# Patient Record
Sex: FEMALE | Race: BLACK OR AFRICAN AMERICAN | NOT HISPANIC OR LATINO | URBAN - METROPOLITAN AREA
[De-identification: names, ages, dates, MRNs, and addresses within clinical notes are randomized per-mention and may not be internally consistent; named-entity substitution may affect disease eponyms.]

---

## 2023-12-01 ENCOUNTER — ANCILLARY PROCEDURE (OUTPATIENT)
Dept: EMERGENCY MEDICINE | Facility: HOSPITAL | Age: 43
End: 2023-12-01
Payer: MEDICAID

## 2023-12-01 ENCOUNTER — APPOINTMENT (OUTPATIENT)
Dept: RADIOLOGY | Facility: HOSPITAL | Age: 43
End: 2023-12-01
Payer: MEDICAID

## 2023-12-01 ENCOUNTER — HOSPITAL ENCOUNTER (EMERGENCY)
Facility: HOSPITAL | Age: 43
Discharge: HOME | End: 2023-12-02
Attending: EMERGENCY MEDICINE
Payer: MEDICAID

## 2023-12-01 DIAGNOSIS — G43.009 MIGRAINE WITHOUT AURA AND WITHOUT STATUS MIGRAINOSUS, NOT INTRACTABLE: Primary | ICD-10-CM

## 2023-12-01 LAB
ALBUMIN SERPL BCP-MCNC: 4.7 G/DL (ref 3.4–5)
ALP SERPL-CCNC: 80 U/L (ref 33–110)
ALT SERPL W P-5'-P-CCNC: 11 U/L (ref 7–45)
ANION GAP BLDV CALCULATED.4IONS-SCNC: 15 MMOL/L (ref 10–25)
ANION GAP BLDV CALCULATED.4IONS-SCNC: ABNORMAL MMOL/L
ANION GAP SERPL CALC-SCNC: 19 MMOL/L (ref 10–20)
AST SERPL W P-5'-P-CCNC: 15 U/L (ref 9–39)
ATRIAL RATE: 74 BPM
B-HCG SERPL-ACNC: <3 MIU/ML
BASE EXCESS BLDV CALC-SCNC: -0.6 MMOL/L (ref -2–3)
BASE EXCESS BLDV CALC-SCNC: -0.9 MMOL/L (ref -2–3)
BASOPHILS # BLD AUTO: 0.03 X10*3/UL (ref 0–0.1)
BASOPHILS NFR BLD AUTO: 0.2 %
BILIRUB SERPL-MCNC: 0.7 MG/DL (ref 0–1.2)
BODY TEMPERATURE: 37 DEGREES CELSIUS
BODY TEMPERATURE: 37 DEGREES CELSIUS
BUN SERPL-MCNC: 7 MG/DL (ref 6–23)
CA-I BLDV-SCNC: 0.86 MMOL/L (ref 1.1–1.33)
CA-I BLDV-SCNC: 1.22 MMOL/L (ref 1.1–1.33)
CALCIUM SERPL-MCNC: 10.4 MG/DL (ref 8.6–10.6)
CARDIAC TROPONIN I PNL SERPL HS: 3 NG/L (ref 0–34)
CHLORIDE BLDV-SCNC: 108 MMOL/L (ref 98–107)
CHLORIDE BLDV-SCNC: 109 MMOL/L (ref 98–107)
CHLORIDE SERPL-SCNC: 105 MMOL/L (ref 98–107)
CO2 SERPL-SCNC: 17 MMOL/L (ref 21–32)
CREAT SERPL-MCNC: 0.88 MG/DL (ref 0.5–1.05)
EOSINOPHIL # BLD AUTO: 0.02 X10*3/UL (ref 0–0.7)
EOSINOPHIL NFR BLD AUTO: 0.2 %
ERYTHROCYTE [DISTWIDTH] IN BLOOD BY AUTOMATED COUNT: 14.7 % (ref 11.5–14.5)
GFR SERPL CREATININE-BSD FRML MDRD: 83 ML/MIN/1.73M*2
GLUCOSE BLDV-MCNC: 118 MG/DL (ref 74–99)
GLUCOSE BLDV-MCNC: 124 MG/DL (ref 74–99)
GLUCOSE SERPL-MCNC: 113 MG/DL (ref 74–99)
HCO3 BLDV-SCNC: 17.9 MMOL/L (ref 22–26)
HCO3 BLDV-SCNC: 19.2 MMOL/L (ref 22–26)
HCT VFR BLD AUTO: 39.8 % (ref 36–46)
HCT VFR BLD EST: 39 % (ref 36–46)
HCT VFR BLD EST: 40 % (ref 36–46)
HGB BLD-MCNC: 12.9 G/DL (ref 12–16)
HGB BLDV-MCNC: 12.9 G/DL (ref 12–16)
HGB BLDV-MCNC: 13.3 G/DL (ref 12–16)
HOLD SPECIMEN: NORMAL
IMM GRANULOCYTES # BLD AUTO: 0.04 X10*3/UL (ref 0–0.7)
IMM GRANULOCYTES NFR BLD AUTO: 0.3 % (ref 0–0.9)
LACTATE BLDV-SCNC: 2.3 MMOL/L (ref 0.4–2)
LACTATE BLDV-SCNC: 3.1 MMOL/L (ref 0.4–2)
LYMPHOCYTES # BLD AUTO: 4.29 X10*3/UL (ref 1.2–4.8)
LYMPHOCYTES NFR BLD AUTO: 34.3 %
MAGNESIUM SERPL-MCNC: 2.06 MG/DL (ref 1.6–2.4)
MCH RBC QN AUTO: 27.7 PG (ref 26–34)
MCHC RBC AUTO-ENTMCNC: 32.4 G/DL (ref 32–36)
MCV RBC AUTO: 86 FL (ref 80–100)
MONOCYTES # BLD AUTO: 0.72 X10*3/UL (ref 0.1–1)
MONOCYTES NFR BLD AUTO: 5.8 %
NEUTROPHILS # BLD AUTO: 7.39 X10*3/UL (ref 1.2–7.7)
NEUTROPHILS NFR BLD AUTO: 59.2 %
NRBC BLD-RTO: 0 /100 WBCS (ref 0–0)
OXYHGB MFR BLDV: 94 % (ref 45–75)
OXYHGB MFR BLDV: 96.2 % (ref 45–75)
P AXIS: 63 DEGREES
P OFFSET: 211 MS
P ONSET: 156 MS
PCO2 BLDV: 17 MM HG (ref 41–51)
PCO2 BLDV: 21 MM HG (ref 41–51)
PH BLDV: 7.57 PH (ref 7.33–7.43)
PH BLDV: 7.63 PH (ref 7.33–7.43)
PLATELET # BLD AUTO: 441 X10*3/UL (ref 150–450)
PO2 BLDV: 64 MM HG (ref 35–45)
PO2 BLDV: 82 MM HG (ref 35–45)
POTASSIUM BLDV-SCNC: 3.6 MMOL/L (ref 3.5–5.3)
POTASSIUM BLDV-SCNC: >10 MMOL/L (ref 3.5–5.3)
POTASSIUM SERPL-SCNC: 3.5 MMOL/L (ref 3.5–5.3)
PR INTERVAL: 136 MS
PROT SERPL-MCNC: 8.5 G/DL (ref 6.4–8.2)
Q ONSET: 224 MS
QRS COUNT: 12 BEATS
QRS DURATION: 86 MS
QT INTERVAL: 398 MS
QTC CALCULATION(BAZETT): 441 MS
QTC FREDERICIA: 427 MS
R AXIS: 41 DEGREES
RBC # BLD AUTO: 4.65 X10*6/UL (ref 4–5.2)
SAO2 % BLDV: 96 % (ref 45–75)
SAO2 % BLDV: 98 % (ref 45–75)
SODIUM BLDV-SCNC: 128 MMOL/L (ref 136–145)
SODIUM BLDV-SCNC: 140 MMOL/L (ref 136–145)
SODIUM SERPL-SCNC: 137 MMOL/L (ref 136–145)
T AXIS: 42 DEGREES
T OFFSET: 423 MS
VENTRICULAR RATE: 74 BPM
WBC # BLD AUTO: 12.5 X10*3/UL (ref 4.4–11.3)

## 2023-12-01 PROCEDURE — 74177 CT ABD & PELVIS W/CONTRAST: CPT | Performed by: RADIOLOGY

## 2023-12-01 PROCEDURE — 84132 ASSAY OF SERUM POTASSIUM: CPT

## 2023-12-01 PROCEDURE — 2550000001 HC RX 255 CONTRASTS: Mod: SE | Performed by: EMERGENCY MEDICINE

## 2023-12-01 PROCEDURE — 2500000004 HC RX 250 GENERAL PHARMACY W/ HCPCS (ALT 636 FOR OP/ED): Mod: SE

## 2023-12-01 PROCEDURE — 96374 THER/PROPH/DIAG INJ IV PUSH: CPT | Mod: 59

## 2023-12-01 PROCEDURE — 71275 CT ANGIOGRAPHY CHEST: CPT

## 2023-12-01 PROCEDURE — 71275 CT ANGIOGRAPHY CHEST: CPT | Performed by: RADIOLOGY

## 2023-12-01 PROCEDURE — 99285 EMERGENCY DEPT VISIT HI MDM: CPT | Performed by: EMERGENCY MEDICINE

## 2023-12-01 PROCEDURE — 70450 CT HEAD/BRAIN W/O DYE: CPT | Performed by: RADIOLOGY

## 2023-12-01 PROCEDURE — 96375 TX/PRO/DX INJ NEW DRUG ADDON: CPT | Mod: 59

## 2023-12-01 PROCEDURE — 36415 COLL VENOUS BLD VENIPUNCTURE: CPT

## 2023-12-01 PROCEDURE — 2500000004 HC RX 250 GENERAL PHARMACY W/ HCPCS (ALT 636 FOR OP/ED): Mod: SE | Performed by: EMERGENCY MEDICINE

## 2023-12-01 PROCEDURE — 83735 ASSAY OF MAGNESIUM: CPT

## 2023-12-01 PROCEDURE — 96361 HYDRATE IV INFUSION ADD-ON: CPT | Mod: 59

## 2023-12-01 PROCEDURE — 70450 CT HEAD/BRAIN W/O DYE: CPT

## 2023-12-01 PROCEDURE — 74177 CT ABD & PELVIS W/CONTRAST: CPT

## 2023-12-01 PROCEDURE — 84484 ASSAY OF TROPONIN QUANT: CPT

## 2023-12-01 PROCEDURE — 84702 CHORIONIC GONADOTROPIN TEST: CPT

## 2023-12-01 PROCEDURE — 85025 COMPLETE CBC W/AUTO DIFF WBC: CPT

## 2023-12-01 PROCEDURE — 93005 ELECTROCARDIOGRAM TRACING: CPT

## 2023-12-01 RX ORDER — LORAZEPAM 2 MG/ML
1 INJECTION INTRAMUSCULAR ONCE
Status: COMPLETED | OUTPATIENT
Start: 2023-12-01 | End: 2023-12-01

## 2023-12-01 RX ORDER — METOCLOPRAMIDE HYDROCHLORIDE 5 MG/ML
10 INJECTION INTRAMUSCULAR; INTRAVENOUS ONCE
Status: COMPLETED | OUTPATIENT
Start: 2023-12-01 | End: 2023-12-01

## 2023-12-01 RX ORDER — LORAZEPAM 2 MG/ML
INJECTION INTRAMUSCULAR
Status: COMPLETED
Start: 2023-12-01 | End: 2023-12-01

## 2023-12-01 RX ORDER — ACETAMINOPHEN 325 MG/1
975 TABLET ORAL ONCE
Status: COMPLETED | OUTPATIENT
Start: 2023-12-01 | End: 2023-12-01

## 2023-12-01 RX ADMIN — LORAZEPAM 1 MG: 2 INJECTION INTRAMUSCULAR; INTRAVENOUS at 17:54

## 2023-12-01 RX ADMIN — SODIUM CHLORIDE, POTASSIUM CHLORIDE, SODIUM LACTATE AND CALCIUM CHLORIDE 1000 ML: 600; 310; 30; 20 INJECTION, SOLUTION INTRAVENOUS at 20:53

## 2023-12-01 RX ADMIN — METOCLOPRAMIDE 10 MG: 5 INJECTION, SOLUTION INTRAMUSCULAR; INTRAVENOUS at 20:53

## 2023-12-01 RX ADMIN — ACETAMINOPHEN 975 MG: 325 TABLET ORAL at 20:53

## 2023-12-01 RX ADMIN — LORAZEPAM 1 MG: 2 INJECTION INTRAMUSCULAR at 17:54

## 2023-12-01 RX ADMIN — IOHEXOL 95 ML: 350 INJECTION, SOLUTION INTRAVENOUS at 19:14

## 2023-12-01 ASSESSMENT — COLUMBIA-SUICIDE SEVERITY RATING SCALE - C-SSRS
1. IN THE PAST MONTH, HAVE YOU WISHED YOU WERE DEAD OR WISHED YOU COULD GO TO SLEEP AND NOT WAKE UP?: NO
2. HAVE YOU ACTUALLY HAD ANY THOUGHTS OF KILLING YOURSELF?: NO
6. HAVE YOU EVER DONE ANYTHING, STARTED TO DO ANYTHING, OR PREPARED TO DO ANYTHING TO END YOUR LIFE?: NO

## 2023-12-01 NOTE — ED TRIAGE NOTES
Patient family states he had to lift her into the car due to bilateral arm/leg numbness chest pain. Hx of anxiety attack, staets she did have one today. States she cannot feel her body and her whole body is in pain.

## 2023-12-02 VITALS
TEMPERATURE: 99 F | SYSTOLIC BLOOD PRESSURE: 106 MMHG | HEART RATE: 65 BPM | OXYGEN SATURATION: 94 % | RESPIRATION RATE: 12 BRPM | DIASTOLIC BLOOD PRESSURE: 52 MMHG

## 2023-12-02 NOTE — ED PROVIDER NOTES
CC: Panic Attack     HPI:  This patient is a 43-year-old female with history of migraines who presents to the emergency department with headache, chest pain, hand and feet numbness and tingling as well as tachypnea.  Patient states that the symptoms began around 1230 this afternoon.  Her first symptom was chest pain described as tightness across her chest, nonradiating.  This progressed to a headache that is nonfocal.  Symptoms eventually progressed to include pain as well as numbness and tingling in her bilateral hands and feet.  She states she has been walking around at home unable to get comfortable.  She has been breathing fast and feels anxious.  She denies fevers or recent illnesses.  Has been eating and drinking well prior to the onset of her symptoms.  Denies other symptoms at this time including abdominal pain, urinary symptoms.    Limitations to history: Anxiety  Independent historian(s): Patient  Records Reviewed: Recent available ED and inpatient notes reviewed in EMR.    PMHx/PSHx:  Per HPI.   - has no past medical history on file.  - has no past surgical history on file.    Medications:  Reviewed in EMR. See EMR for complete list of medications and doses.    Allergies:  Patient has no known allergies.    Social History:  - Tobacco:  has no history on file for tobacco use.   - Alcohol:  has no history on file for alcohol use.   - Illicit Drugs:  has no history on file for drug use.     ROS:  Per HPI.       ???????????????????????????????????????????????????????????????  Triage Vitals:  T 37.2 °C (99 °F)    /70  RR 24  O2 100 %      Physical Exam    General: Female patient lying on her side, writhing in bed, unable to participate in conversation, tachypneic.  Head: Normocephalic. Atraumatic.  Neck:  FROM. No gross masses.   Eyes: EOMI. No scleral icterus or injection.  ENT: Moist mucous membranes, no apparent trauma or lesions.  CV: Tachycardic. No murmurs, rubs, gallops appreciated. 2+  radial pulses bilaterally.  Resp: Tachypneic.  Clear to auscultation bilaterally. No respiratory distress.   GI: Soft, non-distended.  Mild diffuse tenderness to palpation.  No rebound tenderness or guarding. No palpable masses.   EXT: No peripheral edema, contusions, or wounds.  Skin: Warm and dry, no rashes or lesions.  Neuro: Alert and oriented.  Cranial nerves II-XII grossly intact.  No focal neurological deficits.  Motor strength intact and equal in bilateral upper and lower extremities.  Sensation intact however abnormal in all 4 extremities.  Speech fluent.  Psych: Anxious.    ???????????????????????????????????????????????????????????????  Labs:   Labs Reviewed   CBC WITH AUTO DIFFERENTIAL - Abnormal       Result Value    WBC 12.5 (*)     nRBC 0.0      RBC 4.65      Hemoglobin 12.9      Hematocrit 39.8      MCV 86      MCH 27.7      MCHC 32.4      RDW 14.7 (*)     Platelets 441      Neutrophils % 59.2      Immature Granulocytes %, Automated 0.3      Lymphocytes % 34.3      Monocytes % 5.8      Eosinophils % 0.2      Basophils % 0.2      Neutrophils Absolute 7.39      Immature Granulocytes Absolute, Automated 0.04      Lymphocytes Absolute 4.29      Monocytes Absolute 0.72      Eosinophils Absolute 0.02      Basophils Absolute 0.03     COMPREHENSIVE METABOLIC PANEL - Abnormal    Glucose 113 (*)     Sodium 137      Potassium 3.5      Chloride 105      Bicarbonate 17 (*)     Anion Gap 19      Urea Nitrogen 7      Creatinine 0.88      eGFR 83      Calcium 10.4      Albumin 4.7      Alkaline Phosphatase 80      Total Protein 8.5 (*)     AST 15      Bilirubin, Total 0.7      ALT 11     BLOOD GAS VENOUS FULL PANEL UNSOLICITED - Abnormal    POCT pH, Venous 7.63 (*)     POCT pCO2, Venous 17 (*)     POCT pO2, Venous 64 (*)     POCT SO2, Venous 96 (*)     POCT Oxy Hemoglobin, Venous 94.0 (*)     POCT Hematocrit Calculated, Venous 40.0      POCT Sodium, Venous 128 (*)     POCT Potassium, Venous >10.0 (*)     POCT  Chloride, Venous 108 (*)     POCT Ionized Calicum, Venous 0.86 (*)     POCT Glucose, Venous 118 (*)     POCT Lactate, Venous 3.1 (*)     POCT Base Excess, Venous -0.6      POCT HCO3 Calculated, Venous 17.9 (*)     POCT Hemoglobin, Venous 13.3      POCT Anion Gap, Venous        Patient Temperature 37.0     BLOOD GAS VENOUS FULL PANEL UNSOLICITED - Abnormal    POCT pH, Venous 7.57 (*)     POCT pCO2, Venous 21 (*)     POCT pO2, Venous 82 (*)     POCT SO2, Venous 98 (*)     POCT Oxy Hemoglobin, Venous 96.2 (*)     POCT Hematocrit Calculated, Venous 39.0      POCT Sodium, Venous 140      POCT Potassium, Venous 3.6      POCT Chloride, Venous 109 (*)     POCT Ionized Calicum, Venous 1.22      POCT Glucose, Venous 124 (*)     POCT Lactate, Venous 2.3 (*)     POCT Base Excess, Venous -0.9      POCT HCO3 Calculated, Venous 19.2 (*)     POCT Hemoglobin, Venous 12.9      POCT Anion Gap, Venous 15.0      Patient Temperature 37.0     MAGNESIUM - Normal    Magnesium 2.06     HUMAN CHORIONIC GONADOTROPIN, SERUM QUANTITATIVE - Normal    HCG, Beta-Quantitative <3      Narrative:     Total HCG measurement is performed using the [employer] immunoassay which detects intact HCG and free beta HCG subunit.  This test is not indicated for use as a tumor marker.  HCG testing is performed using a different test methodology at [facility]3] than other [facility]. Direct result comparison  should only be made within the same method.          GRAY TOP    Extra Tube Hold for add-ons.     BLOOD GAS LACTIC ACID, VENOUS   BLOOD GAS LACTIC ACID, VENOUS   TROPONIN I, HIGH SENSITIVITY        Imaging:   CT angio chest for pulmonary embolism   Final Result   1. No acute pulmonary embolism to the segmental level.   2. Scattered tree-in-bud opacities suggestive of small airway/small   vessel disease.   3.  Incidental Finding:  A non-calcified pulmonary nodule measures   0.3 cm, likely benign. No further follow-up is required,  however, if   the patient has high risk factors for primary lung malignancy,   follow-up noncontrast CT scan chest in 12 months may be obtained.   (Richard Corona et al., Guidelines for management of incidental   pulmonary nodules detected on CT images: From the Fleischner Society   2017, Radiology. 2017 Jul;284 (1):228-243.) [email or web address]             I personally reviewed the images/study and I agree with the findings   as stated above by resident physician, Chris Chanel MD. This study   was interpreted at [place], [place].        MACRO:   None.        Signed by: Maren Wood 2023 8:29 PM   Dictation workstation:   [id number]      CT abdomen pelvis w IV contrast   Final Result   1. No acute abnormality within the abdomen or pelvis.   2. Additional findings as above.        I personally reviewed the images/study and I agree with the findings   as stated above by resident physician, Chris Chanel MD. This study   was interpreted at [place], [place].        MACRO:   None.        Signed by: Maren Wood 2023 8:33 PM   Dictation workstation:   [id number]      CT head wo IV contrast   Final Result   No acute intracranial abnormality or calvarial fracture.        I personally reviewed the images/study and I agree with the findings   as stated above by resident physician, Chris Chanel MD. This study   was interpreted at [place], [place].        MACRO:   None.        Signed by: Maren Wood 2023 8:23 PM   Dictation workstation:   [id number]           EK: 59: Rate of 74 bpm, sinus rhythm, normal axis, normal intervals, no evidence of ST segment elevation or depression, no pattern T wave abnormalities.  Similar findings to EKG obtained 2020.    MDM:  This patient is a 43-year-old female who presents to the emergency department with  chest pain, headache, and abnormal sensations in bilateral hands and feet.  She is hemodynamically stable on arrival however tachypneic and extremely anxious.  Patient is unable to engage in initial history as she is perseverating on her symptoms and unable to focus or sit still.  At 1 point she got out of the bed and sat on the floor.  She required anxiolysis with 1 mg of Ativan to calm down.  When she was calm additional history was able to be obtained.  Given her presentation and symptoms, differential diagnosis is broad.  Upon chart review patient has had multiple presentations in the past outside hospitals for similar presentations which were found to be associated with migraines.  Despite this we considered pulmonary embolism, aortic pathology such as dissection or aneurysm, ACS, intoxication or other toxidromes, ectopic pregnancy, other causes of hypoxia, panic attack, stroke.  Gas was obtained once IV access was established with a pH of 7.57, CO2 of 21 consistent with her hyperventilation.  Basic labs obtained showing mild leukocytosis of 12.5 however no anemia.  Metabolic panel shows a bicarb of 17 however other electrolytes are normal.  Beta-hCG is negative.  Troponin is 3.  EKG is nonischemic.  Given essentially altered mental status a CT head was obtained showing no acute findings.  Aside from abnormal sensations given her nonfocal neurologic exam I have low suspicion for stroke.  CT PE study was obtained to rule out pulmonary embolism and was negative.  CT abdomen pelvis obtained as well with no intra-abdominal pathology.  Once the patient was calm and able to provide additional history seems that her headache and extremity symptoms could be caused by similar presentations in the past with migraines.  Patient was provided migraine cocktail including Tylenol, Reglan, and IV fluids.  She was allowed to rest and on reexamination is reporting complete resolution of her symptoms of headache, chest pain, and  extremity numbness and tingling.  Because of this I do believe her presentation may have been due to a complex migraine and anxiety.  At this time I believe she is appropriate for discharge home with outpatient follow-up.  She expressed understanding and agreed with this plan.  She remained hemodynamically stable and is discharged home.    ED Course:  Diagnoses as of 12/02/23 0024   Migraine without aura and without status migrainosus, not intractable       Social Determinants Limiting Care:  None identified    Disposition:  Discharge    Santosh Sanchez, DO   Emergency Medicine PGY-2  [facility]      Procedures ? Smart[employer] last updated 12/1/2023 9:17 PM       ATTENDING ATTESTATION  43-year-old female with a history of known migraines, anxiety presenting to the emergency department with multiple complaints.  She states about 1230 today, spontaneously experienced what she stated was a aching atraumatic discomfort in the chest, bilateral, radiating to the left flank without urinary symptoms, experienced shortly thereafter headache, as I take history the patient is actively experiencing what she states is an anxiety attack with rapid respiratory rate, states she is not able to really give me a detailed history of the timing of the events today but does state that the headache was gradual in its onset, escalating over the course of at least the past 5 hours and is in line at least partially with prior described migraines as I review the records from [facility] back as far as 2015 and 2018.  Patient seems to have had similar presentations then as well although, there was not an exaggerated description of an anxiety component at that time at least that I saw.    Patient is moving all extremities spontaneously, her neuroexam appears nonfocal she is awake alert and oriented, family members including sister and significant other at the bedside to provide surrogate history.  Patient  was given 1 mg of Ativan IV, her EKG which was initial sinus tachycardia without RV strain was repeated, normal sinus rhythm, certainly no sign of ischemia no sign of RV strain low concern for PE with no pleuritic component of the discomfort.  Patient does have symmetric upper extremity pulses, describes gradual onset my clinical concern for dissection is low.    Patient was moved into a room, IV was established.  Given the patient's clinical presentation description of symptoms, initial gas does give rise to likely anxiety component, respiratory alkalosis is appreciated however, there is a component of elevated lactate indicating a potential concomitant metabolic process, abdomen is soft without guarding or rigidity, but given her chest and belly discomfort we will obtain a CT PE, abdomen and pelvis to evaluate for potential intrathoracic or abdominal pathology.  Her headache was not worst headache of life, gradual in onset normal neuroexam, we will CT the head but my clinical concern for intracranial abnormality such as dissection is low, the jazmine of events was chest and then had then anxiety as described by her.    UPDATE 2130  Patient's CT PE negative, CT abdomen and pelvis likewise unremarkable, the patient has some lung nodules that were already made known to the patient, she already has appropriate outpatient follow-up in this regard she actually made mention of the lung nodules before we CT her we will reiterate this at disposition.  Furthermore the patient CT head was negative.  The patient has migraines, she was treated, had some improvement of the discomfort, we will further treatment to include Reglan and fluids as the patient's clinical presentation seems most clinically consistent with anxiety attack plus migrainous headache.  She is already feeling better vital signs remained stable anticipate a potential safe discharge home after treatment    EKG reviewed independently by me and agree with  interpretation above.    Nima Pearson DO  [facility]    The patient was seen by the resident/fellow.  I have personally performed a substantive portion of the encounter.  I have seen and examined the patient; agree with the workup, evaluation, MDM, management and diagnosis.    I have reviewed all the nurses' notes and have confirmed their findings, and have incorporated those findings into this medical record.   The care plan has been discussed with the resident/fellow; I have reviewed the resident/fellow’s note and agree with the documented findings with the exception/addition of information listed above.  On my own examination I agree and incorporated in this document my own history, examination findings and clinical decision making.  All notation in this Addendum supersedes information presented by the resident or NOEMI as listed above.        Santosh Sanchez DO  Resident  12/02/23 [id number]

## 2024-05-13 ENCOUNTER — APPOINTMENT (OUTPATIENT)
Dept: PRIMARY CARE | Facility: CLINIC | Age: 44
End: 2024-05-13
Payer: MEDICAID

## 2024-06-06 ENCOUNTER — OFFICE VISIT (OUTPATIENT)
Dept: PRIMARY CARE | Facility: CLINIC | Age: 44
End: 2024-06-06
Payer: MEDICAID

## 2024-06-06 VITALS
SYSTOLIC BLOOD PRESSURE: 123 MMHG | RESPIRATION RATE: 16 BRPM | OXYGEN SATURATION: 98 % | DIASTOLIC BLOOD PRESSURE: 62 MMHG | HEART RATE: 86 BPM

## 2024-06-06 DIAGNOSIS — F43.10 PTSD (POST-TRAUMATIC STRESS DISORDER): ICD-10-CM

## 2024-06-06 DIAGNOSIS — M54.50 ACUTE MIDLINE LOW BACK PAIN WITHOUT SCIATICA: ICD-10-CM

## 2024-06-06 DIAGNOSIS — M25.561 ACUTE PAIN OF RIGHT KNEE: ICD-10-CM

## 2024-06-06 DIAGNOSIS — M25.551 PAIN OF RIGHT HIP: ICD-10-CM

## 2024-06-06 DIAGNOSIS — R91.8 ABNORMAL CT SCAN OF LUNG: ICD-10-CM

## 2024-06-06 DIAGNOSIS — M54.2 NECK PAIN: ICD-10-CM

## 2024-06-06 DIAGNOSIS — F07.81 POST CONCUSSION SYNDROME: Primary | ICD-10-CM

## 2024-06-06 DIAGNOSIS — M25.511 ACUTE PAIN OF RIGHT SHOULDER: ICD-10-CM

## 2024-06-06 PROCEDURE — 99203 OFFICE O/P NEW LOW 30 MIN: CPT | Performed by: STUDENT IN AN ORGANIZED HEALTH CARE EDUCATION/TRAINING PROGRAM
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
## 2024-06-17 ENCOUNTER — TRANSCRIBE ORDERS (OUTPATIENT)
Dept: ORTHOPEDIC SURGERY | Facility: HOSPITAL | Age: 44
End: 2024-06-17
Payer: MEDICAID

## 2024-06-17 DIAGNOSIS — M54.50 LOW BACK PAIN, UNSPECIFIED BACK PAIN LATERALITY, UNSPECIFIED CHRONICITY, UNSPECIFIED WHETHER SCIATICA PRESENT: ICD-10-CM

## 2024-06-18 ENCOUNTER — APPOINTMENT (OUTPATIENT)
Dept: RADIOLOGY | Facility: CLINIC | Age: 44
End: 2024-06-18
Payer: MEDICAID

## 2024-06-26 ENCOUNTER — APPOINTMENT (OUTPATIENT)
Dept: ORTHOPEDIC SURGERY | Facility: HOSPITAL | Age: 44
End: 2024-06-26
Payer: MEDICAID

## 2024-06-26 ENCOUNTER — APPOINTMENT (OUTPATIENT)
Dept: RADIOLOGY | Facility: HOSPITAL | Age: 44
End: 2024-06-26
Payer: MEDICAID

## 2024-06-26 DIAGNOSIS — M25.511 RIGHT SHOULDER PAIN, UNSPECIFIED CHRONICITY: ICD-10-CM

## 2024-06-29 ASSESSMENT — ENCOUNTER SYMPTOMS
DYSURIA: 0
HEMATURIA: 0
SHORTNESS OF BREATH: 0
WHEEZING: 0
ABDOMINAL PAIN: 0
NAUSEA: 0
FEVER: 0
CONSTIPATION: 0
HEADACHES: 1
ARTHRALGIAS: 1
SPEECH DIFFICULTY: 0
COUGH: 0
WEAKNESS: 0
VOMITING: 0
ACTIVITY CHANGE: 0
DIZZINESS: 0
NUMBNESS: 0

## 2024-07-03 PROBLEM — N92.0 MENORRHAGIA WITH REGULAR CYCLE: Status: ACTIVE | Noted: 2020-01-13

## 2024-07-03 PROBLEM — N94.6 DYSMENORRHEA: Status: ACTIVE | Noted: 2020-01-13

## 2024-07-03 PROBLEM — M25.551 HIP PAIN, ACUTE, RIGHT: Status: ACTIVE | Noted: 2023-12-21

## 2024-07-03 RX ORDER — ERGOCALCIFEROL 1.25 MG/1
50000 CAPSULE ORAL WEEKLY
COMMUNITY

## 2024-07-03 RX ORDER — IBUPROFEN 800 MG/1
1 TABLET ORAL EVERY 8 HOURS PRN
COMMUNITY
Start: 2023-12-21

## 2024-07-03 RX ORDER — METHOCARBAMOL 750 MG/1
750 TABLET, FILM COATED ORAL 4 TIMES DAILY
COMMUNITY
Start: 2023-12-21

## 2024-07-03 RX ORDER — ACETAMINOPHEN 500 MG
1 TABLET ORAL EVERY 6 HOURS PRN
COMMUNITY
Start: 2023-12-21

## 2024-07-03 RX ORDER — CYCLOBENZAPRINE HCL 10 MG
10 TABLET ORAL
COMMUNITY

## 2024-07-09 ENCOUNTER — APPOINTMENT (OUTPATIENT)
Dept: BEHAVIORAL HEALTH | Facility: CLINIC | Age: 44
End: 2024-07-09
Payer: MEDICAID

## 2024-07-09 DIAGNOSIS — F33.1 MODERATE EPISODE OF RECURRENT MAJOR DEPRESSIVE DISORDER (MULTI): ICD-10-CM
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
## 2024-07-15 ENCOUNTER — APPOINTMENT (OUTPATIENT)
Dept: NEUROSURGERY | Facility: CLINIC | Age: 44
End: 2024-07-15
Payer: MEDICAID

## 2024-07-15 VITALS
HEART RATE: 72 BPM | HEIGHT: 63 IN | WEIGHT: 222 LBS | RESPIRATION RATE: 20 BRPM | BODY MASS INDEX: 39.34 KG/M2 | DIASTOLIC BLOOD PRESSURE: 84 MMHG | SYSTOLIC BLOOD PRESSURE: 129 MMHG

## 2024-07-15 DIAGNOSIS — M54.2 CERVICALGIA: Primary | ICD-10-CM

## 2024-07-15 PROCEDURE — 99202 OFFICE O/P NEW SF 15 MIN: CPT | Performed by: NEUROLOGICAL SURGERY

## 2024-07-15 ASSESSMENT — ENCOUNTER SYMPTOMS
BACK PAIN: 1
ENDOCRINE NEGATIVE: 1
ALLERGIC/IMMUNOLOGIC NEGATIVE: 1
GASTROINTESTINAL NEGATIVE: 1
DIZZINESS: 1
HEMATOLOGIC/LYMPHATIC NEGATIVE: 1
RESPIRATORY NEGATIVE: 1
NUMBNESS: 1
FATIGUE: 1
NECK PAIN: 1
WEAKNESS: 1
PSYCHIATRIC NEGATIVE: 1
HEADACHES: 1
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
## 2024-07-16 ENCOUNTER — APPOINTMENT (OUTPATIENT)
Dept: RADIOLOGY | Facility: HOSPITAL | Age: 44
End: 2024-07-16
Payer: MEDICAID

## 2024-07-16 ENCOUNTER — APPOINTMENT (OUTPATIENT)
Dept: ORTHOPEDIC SURGERY | Facility: HOSPITAL | Age: 44
End: 2024-07-16
Payer: MEDICAID

## 2024-07-16 DIAGNOSIS — M25.551 HIP PAIN, ACUTE, RIGHT: Primary | ICD-10-CM

## 2024-07-16 DIAGNOSIS — E66.09 CLASS 2 OBESITY DUE TO EXCESS CALORIES WITHOUT SERIOUS COMORBIDITY WITH BODY MASS INDEX (BMI) OF 39.0 TO 39.9 IN ADULT: ICD-10-CM

## 2024-07-16 NOTE — PROGRESS NOTES
Sports Medicine Office Note    Today's Date:  07/16/2024     HPI: Alayna Brantley is a 44 y.o. *** who presents today for ***    ***    *** has no other complaints.    Physical Examination:     ***    Imaging:  Radiographs of the *** were reviewed and revealed ***.  The studies were reviewed by me personally in the office today.    === 08/26/20 ===    XR CHEST 1 VIEW    - Impression -  Mild prominence of pulmonary interstitial markings with intervening  ill-defined hazy opacities in bilateral lungs raises concern for  atypical infection.      Procedure:  After consent was obtained, *** was prepped in a sterile fashion. Ultrasound guidance was used to help insure proper needle placement into the ***, decrease patient discomfort, and decrease collateral damage. The joint was visualized and Depo-Medrol 80 mg with lidocaine 4 mL & ropivacaine 4 mL were injected without any complications. Ultrasound images were saved on an internal file for later reference. The patient tolerated the procedure well and the area was cleaned and bandaged.    Problem List Items Addressed This Visit    None      Assessment and Plan:     We reviewed the exam and x-ray findings and discussed the conservative and surgical treatment options. We agreed ***    **This note was dictated using Dragon speech recognition software and was not corrected for spelling or grammatical errors**.    Kalin Falcon MD  Sports Medicine Specialist  [facility] [place]

## 2024-08-05 ENCOUNTER — OFFICE VISIT (OUTPATIENT)
Dept: PULMONOLOGY | Facility: CLINIC | Age: 44
End: 2024-08-05
Payer: MEDICAID

## 2024-08-05 ENCOUNTER — OFFICE VISIT (OUTPATIENT)
Dept: PAIN MEDICINE | Facility: HOSPITAL | Age: 44
End: 2024-08-05
Payer: MEDICAID

## 2024-08-05 VITALS
HEIGHT: 63 IN | DIASTOLIC BLOOD PRESSURE: 71 MMHG | SYSTOLIC BLOOD PRESSURE: 110 MMHG | RESPIRATION RATE: 18 BRPM | OXYGEN SATURATION: 97 % | TEMPERATURE: 98.2 F | WEIGHT: 228 LBS | BODY MASS INDEX: 40.4 KG/M2 | HEART RATE: 70 BPM

## 2024-08-05 DIAGNOSIS — Z87.891 HISTORY OF NICOTINE DEPENDENCE: ICD-10-CM

## 2024-08-05 DIAGNOSIS — R91.8 ABNORMAL CT SCAN OF LUNG: ICD-10-CM

## 2024-08-05 DIAGNOSIS — R91.1 LUNG NODULE: ICD-10-CM

## 2024-08-05 DIAGNOSIS — M54.2 CERVICALGIA: ICD-10-CM

## 2024-08-05 DIAGNOSIS — G56.01 CARPAL TUNNEL SYNDROME OF RIGHT WRIST: ICD-10-CM

## 2024-08-05 DIAGNOSIS — R06.00 DYSPNEA, UNSPECIFIED TYPE: Primary | ICD-10-CM

## 2024-08-05 DIAGNOSIS — M54.12 CERVICAL RADICULOPATHY: Primary | ICD-10-CM

## 2024-08-05 DIAGNOSIS — M67.911 ROTATOR CUFF DISORDER, RIGHT: ICD-10-CM

## 2024-08-05 PROCEDURE — 3008F BODY MASS INDEX DOCD: CPT | Performed by: STUDENT IN AN ORGANIZED HEALTH CARE EDUCATION/TRAINING PROGRAM

## 2024-08-05 PROCEDURE — 99204 OFFICE O/P NEW MOD 45 MIN: CPT | Performed by: PAIN MEDICINE

## 2024-08-05 PROCEDURE — 99213 OFFICE O/P EST LOW 20 MIN: CPT | Performed by: STUDENT IN AN ORGANIZED HEALTH CARE EDUCATION/TRAINING PROGRAM

## 2024-08-05 PROCEDURE — 99203 OFFICE O/P NEW LOW 30 MIN: CPT | Performed by: STUDENT IN AN ORGANIZED HEALTH CARE EDUCATION/TRAINING PROGRAM

## 2024-08-05 RX ORDER — NICOTINE 7MG/24HR
1 PATCH, TRANSDERMAL 24 HOURS TRANSDERMAL EVERY 24 HOURS
Qty: 30 PATCH | Refills: 0 | Status: SHIPPED | OUTPATIENT
Start: 2024-08-05 | End: 2024-09-04

## 2024-08-05 RX ORDER — CYCLOBENZAPRINE HCL 10 MG
10 TABLET ORAL NIGHTLY
Qty: 90 TABLET | Refills: 0 | Status: SHIPPED | OUTPATIENT
Start: 2024-08-05 | End: 2024-11-03

## 2024-08-05 ASSESSMENT — ENCOUNTER SYMPTOMS
ARTHRALGIAS: 1
ABDOMINAL DISTENTION: 0
ABDOMINAL PAIN: 0
CHILLS: 0
BACK PAIN: 1
UNEXPECTED WEIGHT CHANGE: 0
LIGHT-HEADEDNESS: 1
PALPITATIONS: 0
FEVER: 0
NECK PAIN: 1

## 2024-08-05 ASSESSMENT — PAIN - FUNCTIONAL ASSESSMENT: PAIN_FUNCTIONAL_ASSESSMENT: 0-10

## 2024-08-05 ASSESSMENT — PAIN SCALES - GENERAL: PAINLEVEL_OUTOF10: 7

## 2024-08-05 NOTE — PATIENT INSTRUCTIONS
Thank you for visiting the Pulmonary Clinic today.   Will get pulmonary function test to check your lung function (call [phone])   Repeat CT chest in December 2024 to follow up lung nodule   Continue to work on quitting smoking   For resources to help quit smoking you can visit the [facility] website at [email or web address]  or call 0-062 QUIT-NOW ([phone])  Return end of December after CT scan   If you have questions or concerns, call [phone] (option 4)
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
## 2024-08-07 ENCOUNTER — APPOINTMENT (OUTPATIENT)
Dept: BEHAVIORAL HEALTH | Facility: CLINIC | Age: 44
End: 2024-08-07
Payer: MEDICAID

## 2024-08-07 DIAGNOSIS — F33.1 MODERATE EPISODE OF RECURRENT MAJOR DEPRESSIVE DISORDER (MULTI): ICD-10-CM

## 2024-08-07 PROCEDURE — 90837 PSYTX W PT 60 MINUTES: CPT | Performed by: SOCIAL WORKER

## 2024-08-07 NOTE — PROGRESS NOTES
Televideo Informed Consent for psychological evaluation was reviewed with the patient as follows:     There are potential benefits and risks of the use of telephone or video-conferencing that differ from in-person sessions. Specifically, the telephone or televideo system we are using may not be HIPPA compliant and may present limits to patient confidentiality. Confidentiality still applies for telepsychology services, and nobody will record the session without your permission.    Understanding and verbal agreement was attested to by the patient. Patient identity was confirmed using 3 sources, including telephone number, email address and date of birth. Provision of services via telehealth was necessitated by the restrictions on face-to-face visits accompanying the COVID-19 pandemic.     SECURE NOTE:  This document may not be released or reproduced in any form without the consent of either the Provider, the Provider´s  or the Chair of the Department of Psychiatry. This prohibition includes copying the document into any non-Restricted area of the Ambulatory Electronic Medical Record.      Start time : 9:00 am  End time : 9:58 am    Diagnoses : MDD    Clients current issues: Client reports ongoing depression. Some passive suicidal thoughts. Client reports hating crowds and wanting to stay home and be safe.     Treatment interventions: IFS  Explored client depression and suicidal parts.  Hopeless, despairing ( feels like she is drowning )   There is also a polarized part that pushes her to stay active and fight.     Prognosis: good    Treatment plan update: Needs more understanding or IFS and ongoing parts work.      Continue weekly ongoing psychotherapy.

## 2024-08-13 ENCOUNTER — OFFICE VISIT (OUTPATIENT)
Dept: ORTHOPEDIC SURGERY | Facility: HOSPITAL | Age: 44
End: 2024-08-13
Payer: MEDICAID

## 2024-08-13 ENCOUNTER — HOSPITAL ENCOUNTER (OUTPATIENT)
Dept: RADIOLOGY | Facility: HOSPITAL | Age: 44
Discharge: HOME | End: 2024-08-13
Payer: MEDICAID

## 2024-08-13 DIAGNOSIS — M75.41 ROTATOR CUFF IMPINGEMENT SYNDROME OF RIGHT SHOULDER: Primary | ICD-10-CM

## 2024-08-13 DIAGNOSIS — M25.511 RIGHT SHOULDER PAIN, UNSPECIFIED CHRONICITY: ICD-10-CM

## 2024-08-13 PROCEDURE — 73030 X-RAY EXAM OF SHOULDER: CPT | Mod: RIGHT SIDE | Performed by: RADIOLOGY

## 2024-08-13 PROCEDURE — 99204 OFFICE O/P NEW MOD 45 MIN: CPT | Performed by: FAMILY MEDICINE

## 2024-08-13 PROCEDURE — 73030 X-RAY EXAM OF SHOULDER: CPT | Mod: RT

## 2024-08-13 PROCEDURE — 99214 OFFICE O/P EST MOD 30 MIN: CPT | Performed by: FAMILY MEDICINE
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
## 2024-08-14 ENCOUNTER — APPOINTMENT (OUTPATIENT)
Dept: BEHAVIORAL HEALTH | Facility: CLINIC | Age: 44
End: 2024-08-14
Payer: MEDICAID

## 2024-08-14 DIAGNOSIS — F33.1 MODERATE EPISODE OF RECURRENT MAJOR DEPRESSIVE DISORDER (MULTI): ICD-10-CM

## 2024-08-14 PROCEDURE — 90837 PSYTX W PT 60 MINUTES: CPT | Performed by: SOCIAL WORKER

## 2024-08-14 NOTE — PROGRESS NOTES
Televideo Informed Consent for psychological evaluation was reviewed with the patient as follows:     There are potential benefits and risks of the use of telephone or video-conferencing that differ from in-person sessions. Specifically, the telephone or televideo system we are using may not be HIPPA compliant and may present limits to patient confidentiality. Confidentiality still applies for telepsychology services, and nobody will record the session without your permission.    Understanding and verbal agreement was attested to by the patient. Patient identity was confirmed using 3 sources, including telephone number, email address and date of birth. Provision of services via telehealth was necessitated by the restrictions on face-to-face visits accompanying the COVID-19 pandemic.     SECURE NOTE:  This document may not be released or reproduced in any form without the consent of either the Provider, the Provider´s  or the Chair of the Department of Psychiatry. This prohibition includes copying the document into any non-Restricted area of the Ambulatory Electronic Medical Record.      Start time : 9:04 am  End time : 9:58 am    Diagnoses : MDD    Clients current issues: She saw pain management last week. Pain in her arm is from hen neck.   Her boyfriend sees other women but client accepts this. He treats her very well otherwise.   Client feels judged by her friends and family.     Treatment interventions:  IFS  Introduced IFS model to client.   Discussed her part that feels judged. -- other people  her for being with a man who has other women.  This part seems to want her to be happy  I shouldn't have to live for other people  In her head   Keep thinking about situations  She feels like people are seeing her as a weakling   ---- this is an over thinking part  People putting their expectations on her  I was always the strong one. - you have to hide your weakness  This part has been with her  forever.    Prognosis: good    Treatment plan update: Client currently doing well. Decrease in depression right now.     Continue weekly ongoing psychotherapy.

## 2024-08-15 ENCOUNTER — HOSPITAL ENCOUNTER (OUTPATIENT)
Dept: RESPIRATORY THERAPY | Facility: CLINIC | Age: 44
Discharge: HOME | End: 2024-08-15
Payer: MEDICAID

## 2024-08-15 DIAGNOSIS — R06.00 DYSPNEA, UNSPECIFIED TYPE: ICD-10-CM

## 2024-08-15 PROCEDURE — 94618 PULMONARY STRESS TESTING: CPT

## 2024-08-15 PROCEDURE — 94727 GAS DIL/WSHOT DETER LNG VOL: CPT

## 2024-08-15 PROCEDURE — 94729 DIFFUSING CAPACITY: CPT

## 2024-08-15 PROCEDURE — 94010 BREATHING CAPACITY TEST: CPT

## 2024-08-21 ENCOUNTER — APPOINTMENT (OUTPATIENT)
Dept: BEHAVIORAL HEALTH | Facility: CLINIC | Age: 44
End: 2024-08-21
Payer: MEDICAID

## 2024-08-27 ENCOUNTER — HOSPITAL ENCOUNTER (OUTPATIENT)
Dept: NEUROLOGY | Facility: CLINIC | Age: 44
Discharge: HOME | End: 2024-08-27
Payer: MEDICAID

## 2024-08-27 ENCOUNTER — APPOINTMENT (OUTPATIENT)
Dept: BEHAVIORAL HEALTH | Facility: CLINIC | Age: 44
End: 2024-08-27
Payer: MEDICAID

## 2024-08-27 DIAGNOSIS — M54.12 CERVICAL RADICULOPATHY: ICD-10-CM

## 2024-08-27 DIAGNOSIS — F33.1 MODERATE EPISODE OF RECURRENT MAJOR DEPRESSIVE DISORDER (MULTI): ICD-10-CM

## 2024-08-27 PROCEDURE — 90837 PSYTX W PT 60 MINUTES: CPT | Performed by: SOCIAL WORKER

## 2024-08-27 PROCEDURE — 95886 MUSC TEST DONE W/N TEST COMP: CPT | Performed by: PSYCHIATRY & NEUROLOGY

## 2024-08-27 PROCEDURE — 95911 NRV CNDJ TEST 9-10 STUDIES: CPT | Performed by: PSYCHIATRY & NEUROLOGY
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
## 2024-08-29 ENCOUNTER — HOSPITAL ENCOUNTER (OUTPATIENT)
Dept: RADIOLOGY | Facility: CLINIC | Age: 44
Discharge: HOME | End: 2024-08-29
Payer: MEDICAID

## 2024-08-29 VITALS — BODY MASS INDEX: 39.69 KG/M2 | HEIGHT: 63 IN | WEIGHT: 224 LBS

## 2024-08-29 DIAGNOSIS — Z12.31 SCREENING MAMMOGRAM FOR BREAST CANCER: ICD-10-CM

## 2024-08-29 PROCEDURE — 77067 SCR MAMMO BI INCL CAD: CPT

## 2024-09-04 ENCOUNTER — APPOINTMENT (OUTPATIENT)
Dept: BEHAVIORAL HEALTH | Facility: CLINIC | Age: 44
End: 2024-09-04
Payer: MEDICAID

## 2024-09-09 ENCOUNTER — OFFICE VISIT (OUTPATIENT)
Dept: PAIN MEDICINE | Facility: HOSPITAL | Age: 44
End: 2024-09-09
Payer: MEDICAID

## 2024-09-09 ASSESSMENT — PAIN SCALES - GENERAL: PAINLEVEL_OUTOF10: 5 - MODERATE PAIN

## 2024-09-09 ASSESSMENT — PAIN - FUNCTIONAL ASSESSMENT: PAIN_FUNCTIONAL_ASSESSMENT: 0-10

## 2024-09-11 ENCOUNTER — APPOINTMENT (OUTPATIENT)
Dept: BEHAVIORAL HEALTH | Facility: CLINIC | Age: 44
End: 2024-09-11
Payer: MEDICAID

## 2024-09-11 DIAGNOSIS — F33.1 MODERATE EPISODE OF RECURRENT MAJOR DEPRESSIVE DISORDER (MULTI): ICD-10-CM

## 2024-09-11 PROCEDURE — 90837 PSYTX W PT 60 MINUTES: CPT | Performed by: SOCIAL WORKER

## 2024-09-11 NOTE — PROGRESS NOTES
Televideo Informed Consent for psychological evaluation was reviewed with the patient as follows:     There are potential benefits and risks of the use of telephone or video-conferencing that differ from in-person sessions. Specifically, the telephone or televideo system we are using may not be HIPPA compliant and may present limits to patient confidentiality. Confidentiality still applies for telepsychology services, and nobody will record the session without your permission.    Understanding and verbal agreement was attested to by the patient. Patient identity was confirmed using 3 sources, including telephone number, email address and date of birth. Provision of services via telehealth was necessitated by the restrictions on face-to-face visits accompanying the COVID-19 pandemic.     SECURE NOTE:  This document may not be released or reproduced in any form without the consent of either the Provider, the Provider´s  or the Chair of the Department of Psychiatry. This prohibition includes copying the document into any non-Restricted area of the Ambulatory Electronic Medical Record.      Start time : 9:01 am  End time : 9:50 am    Diagnoses : MDD    Clients current issues: Client got a part time job. Client is feeling more hopeful. She wants to leave [place]. She is planning to visit her BRO in [place] for a month then go to [place].  Knowing she can take care of herself financially now has brought her so much relief.     Treatment interventions: Provided support and validation. Increased clients hope.  CBT  --- Discussed thought changing and how to do this  Discussed gratitude as a tool to improve depression.     Prognosis: good    Treatment plan update: Client has made progress and her symptoms have decreased.      Continue weekly ongoing psychotherapy.

## 2024-09-12 ENCOUNTER — APPOINTMENT (OUTPATIENT)
Dept: PRIMARY CARE | Facility: CLINIC | Age: 44
End: 2024-09-12
Payer: MEDICAID

## 2024-09-13 DIAGNOSIS — R92.8 ABNORMAL MAMMOGRAM: Primary | ICD-10-CM

## 2024-09-18 ENCOUNTER — APPOINTMENT (OUTPATIENT)
Dept: BEHAVIORAL HEALTH | Facility: CLINIC | Age: 44
End: 2024-09-18
Payer: MEDICAID

## 2024-09-18 ENCOUNTER — OFFICE VISIT (OUTPATIENT)
Dept: PAIN MEDICINE | Facility: HOSPITAL | Age: 44
End: 2024-09-18
Payer: MEDICAID

## 2024-09-18 DIAGNOSIS — M67.911 ROTATOR CUFF DISORDER, RIGHT: ICD-10-CM

## 2024-09-18 DIAGNOSIS — M54.12 CERVICAL RADICULOPATHY: Primary | ICD-10-CM

## 2024-09-18 DIAGNOSIS — G56.01 CARPAL TUNNEL SYNDROME OF RIGHT WRIST: ICD-10-CM

## 2024-09-18 PROCEDURE — 99214 OFFICE O/P EST MOD 30 MIN: CPT | Performed by: PAIN MEDICINE

## 2024-09-18 ASSESSMENT — PAIN SCALES - GENERAL: PAINLEVEL_OUTOF10: 5 - MODERATE PAIN

## 2024-09-18 ASSESSMENT — PAIN - FUNCTIONAL ASSESSMENT: PAIN_FUNCTIONAL_ASSESSMENT: 0-10
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
## 2024-09-20 ENCOUNTER — APPOINTMENT (OUTPATIENT)
Dept: RADIOLOGY | Facility: HOSPITAL | Age: 44
End: 2024-09-20
Payer: MEDICAID

## 2024-09-20 ENCOUNTER — HOSPITAL ENCOUNTER (OUTPATIENT)
Dept: RADIOLOGY | Facility: CLINIC | Age: 44
Discharge: HOME | End: 2024-09-20
Payer: MEDICAID

## 2024-09-20 ENCOUNTER — HOSPITAL ENCOUNTER (EMERGENCY)
Facility: HOSPITAL | Age: 44
Discharge: LEFT BEFORE DISCHARGE | End: 2024-09-20
Attending: EMERGENCY MEDICINE
Payer: MEDICAID

## 2024-09-20 VITALS
BODY MASS INDEX: 38.8 KG/M2 | OXYGEN SATURATION: 98 % | HEART RATE: 75 BPM | SYSTOLIC BLOOD PRESSURE: 117 MMHG | DIASTOLIC BLOOD PRESSURE: 82 MMHG | TEMPERATURE: 98.6 F | HEIGHT: 63 IN | WEIGHT: 219 LBS | RESPIRATION RATE: 16 BRPM

## 2024-09-20 DIAGNOSIS — R19.7 ACUTE DIARRHEA: Primary | ICD-10-CM

## 2024-09-20 DIAGNOSIS — R92.8 ABNORMAL MAMMOGRAM: ICD-10-CM

## 2024-09-20 LAB
ALBUMIN SERPL BCP-MCNC: 4.4 G/DL (ref 3.4–5)
ALP SERPL-CCNC: 69 U/L (ref 33–110)
ALT SERPL W P-5'-P-CCNC: 9 U/L (ref 7–45)
ANION GAP SERPL CALC-SCNC: 12 MMOL/L (ref 10–20)
APPEARANCE UR: CLEAR
AST SERPL W P-5'-P-CCNC: 11 U/L (ref 9–39)
B-HCG SERPL-ACNC: <2 MIU/ML
BACTERIA #/AREA URNS AUTO: ABNORMAL /HPF
BASOPHILS # BLD AUTO: 0.03 X10*3/UL (ref 0–0.1)
BASOPHILS NFR BLD AUTO: 0.4 %
BILIRUB SERPL-MCNC: 0.2 MG/DL (ref 0–1.2)
BILIRUB UR STRIP.AUTO-MCNC: NEGATIVE MG/DL
BUN SERPL-MCNC: 6 MG/DL (ref 6–23)
CALCIUM SERPL-MCNC: 9.4 MG/DL (ref 8.6–10.3)
CHLORIDE SERPL-SCNC: 105 MMOL/L (ref 98–107)
CO2 SERPL-SCNC: 24 MMOL/L (ref 21–32)
COLOR UR: ABNORMAL
CREAT SERPL-MCNC: 0.61 MG/DL (ref 0.5–1.05)
EGFRCR SERPLBLD CKD-EPI 2021: >90 ML/MIN/1.73M*2
EOSINOPHIL # BLD AUTO: 0.05 X10*3/UL (ref 0–0.7)
EOSINOPHIL NFR BLD AUTO: 0.7 %
ERYTHROCYTE [DISTWIDTH] IN BLOOD BY AUTOMATED COUNT: 14.5 % (ref 11.5–14.5)
GLUCOSE SERPL-MCNC: 91 MG/DL (ref 74–99)
GLUCOSE UR STRIP.AUTO-MCNC: NORMAL MG/DL
HCT VFR BLD AUTO: 38.6 % (ref 36–46)
HGB BLD-MCNC: 12.7 G/DL (ref 12–16)
IMM GRANULOCYTES # BLD AUTO: 0.03 X10*3/UL (ref 0–0.7)
IMM GRANULOCYTES NFR BLD AUTO: 0.4 % (ref 0–0.9)
KETONES UR STRIP.AUTO-MCNC: NEGATIVE MG/DL
LACTATE SERPL-SCNC: 0.8 MMOL/L (ref 0.4–2)
LEUKOCYTE ESTERASE UR QL STRIP.AUTO: NEGATIVE
LIPASE SERPL-CCNC: 18 U/L (ref 9–82)
LYMPHOCYTES # BLD AUTO: 2.78 X10*3/UL (ref 1.2–4.8)
LYMPHOCYTES NFR BLD AUTO: 36.4 %
MAGNESIUM SERPL-MCNC: 2.1 MG/DL (ref 1.6–2.4)
MCH RBC QN AUTO: 29.7 PG (ref 26–34)
MCHC RBC AUTO-ENTMCNC: 32.9 G/DL (ref 32–36)
MCV RBC AUTO: 90 FL (ref 80–100)
MONOCYTES # BLD AUTO: 0.43 X10*3/UL (ref 0.1–1)
MONOCYTES NFR BLD AUTO: 5.6 %
MUCOUS THREADS #/AREA URNS AUTO: ABNORMAL /LPF
NEUTROPHILS # BLD AUTO: 4.32 X10*3/UL (ref 1.2–7.7)
NEUTROPHILS NFR BLD AUTO: 56.5 %
NITRITE UR QL STRIP.AUTO: NEGATIVE
NRBC BLD-RTO: 0 /100 WBCS (ref 0–0)
PH UR STRIP.AUTO: 5.5 [PH]
PLATELET # BLD AUTO: 387 X10*3/UL (ref 150–450)
POTASSIUM SERPL-SCNC: 3.9 MMOL/L (ref 3.5–5.3)
PROT SERPL-MCNC: 7.6 G/DL (ref 6.4–8.2)
PROT UR STRIP.AUTO-MCNC: NEGATIVE MG/DL
RBC # BLD AUTO: 4.27 X10*6/UL (ref 4–5.2)
RBC # UR STRIP.AUTO: ABNORMAL /UL
RBC #/AREA URNS AUTO: ABNORMAL /HPF
SODIUM SERPL-SCNC: 137 MMOL/L (ref 136–145)
SP GR UR STRIP.AUTO: 1.01
SQUAMOUS #/AREA URNS AUTO: ABNORMAL /HPF
UROBILINOGEN UR STRIP.AUTO-MCNC: NORMAL MG/DL
WBC # BLD AUTO: 7.6 X10*3/UL (ref 4.4–11.3)
WBC #/AREA URNS AUTO: ABNORMAL /HPF

## 2024-09-20 PROCEDURE — 76642 ULTRASOUND BREAST LIMITED: CPT | Mod: RT

## 2024-09-20 PROCEDURE — 81003 URINALYSIS AUTO W/O SCOPE: CPT | Performed by: SURGERY

## 2024-09-20 PROCEDURE — 2500000004 HC RX 250 GENERAL PHARMACY W/ HCPCS (ALT 636 FOR OP/ED): Performed by: EMERGENCY MEDICINE

## 2024-09-20 PROCEDURE — 83735 ASSAY OF MAGNESIUM: CPT | Performed by: SURGERY

## 2024-09-20 PROCEDURE — 83605 ASSAY OF LACTIC ACID: CPT | Performed by: SURGERY

## 2024-09-20 PROCEDURE — 99283 EMERGENCY DEPT VISIT LOW MDM: CPT | Mod: 25

## 2024-09-20 PROCEDURE — 80053 COMPREHEN METABOLIC PANEL: CPT | Performed by: SURGERY

## 2024-09-20 PROCEDURE — 84702 CHORIONIC GONADOTROPIN TEST: CPT | Performed by: SURGERY

## 2024-09-20 PROCEDURE — 36415 COLL VENOUS BLD VENIPUNCTURE: CPT | Performed by: SURGERY

## 2024-09-20 PROCEDURE — 85025 COMPLETE CBC W/AUTO DIFF WBC: CPT | Performed by: SURGERY

## 2024-09-20 PROCEDURE — 76982 USE 1ST TARGET LESION: CPT | Mod: RT

## 2024-09-20 PROCEDURE — 96360 HYDRATION IV INFUSION INIT: CPT

## 2024-09-20 PROCEDURE — 83690 ASSAY OF LIPASE: CPT | Performed by: SURGERY

## 2024-09-20 ASSESSMENT — COLUMBIA-SUICIDE SEVERITY RATING SCALE - C-SSRS
2. HAVE YOU ACTUALLY HAD ANY THOUGHTS OF KILLING YOURSELF?: NO
1. IN THE PAST MONTH, HAVE YOU WISHED YOU WERE DEAD OR WISHED YOU COULD GO TO SLEEP AND NOT WAKE UP?: NO
6. HAVE YOU EVER DONE ANYTHING, STARTED TO DO ANYTHING, OR PREPARED TO DO ANYTHING TO END YOUR LIFE?: NO

## 2024-09-20 ASSESSMENT — PAIN DESCRIPTION - LOCATION: LOCATION: ABDOMEN

## 2024-09-20 ASSESSMENT — PAIN DESCRIPTION - PAIN TYPE: TYPE: ACUTE PAIN

## 2024-09-20 ASSESSMENT — PAIN - FUNCTIONAL ASSESSMENT: PAIN_FUNCTIONAL_ASSESSMENT: 0-10

## 2024-09-20 ASSESSMENT — PAIN SCALES - GENERAL: PAINLEVEL_OUTOF10: 4

## 2024-09-20 NOTE — ED TRIAGE NOTES
Patient unable to keep any food down, patient eats causes pain then comes out watery and fast. Patient has had this for a few days now. Denies recent antibiotics.

## 2024-09-20 NOTE — ED PROVIDER NOTES
HPI   Chief Complaint   Patient presents with   • Diarrhea     WITH ABDOMINAL PAIN       HPI: []  44-year-old  female who has no medical history comes in with the diarrhea for the last few days.  She states for the last few days she has intermittent diarrhea goes about 4-5 times.  She got better and then got worse again overnight she had severe cramping also no nausea vomiting.  No hematemesis melena hematochezia no recent travel no recent antibiotics no diet indiscretion no sick contacts.  No chest pain pressure heaviness cough congestion.    Past history: Hypertension  Social: Patient is a smoker denies Alcortin A drug abuse.  REVIEW OF SYSTEMS:    GENERAL.: No weight loss, fatigue, anorexia, insomnia, fever.    EYES: No vision loss, double vision, drainage, eye pain.    ENT: No pharyngitis, dry mouth.    CARDIOPULMONARY: No chest pain, palpitations, syncope, near syncope. No shortness of breath, cough, hemoptysis.    GI: Positive for abdominal pain, change in bowel habits, melena, hematemesis, hematochezia, nausea, vomiting, positive for diarrhea.    : No discharge, dysuria, frequency, urgency, hematuria.    MS: No limb pain, joint pain, joint swelling.    SKIN: No rashes.    PSYCH: No depression, anxiety, suicidality, homicidality.    Review of systems is otherwise negative unless stated above or in history of present illness.  Social history, family history, allergies reviewed.  PHYSICAL EXAM:    GENERAL: Vitals noted, no distress. Alert and oriented  x 3. Non-toxic.      EENT: TMs clear. Posterior oropharynx unremarkable. No meningismus. No LAD.     NECK: Supple. Nontender. No midline tenderness.     CARDIAC: Regular, rate, rhythm. No murmurs rubs or gallops. No JVD    PULMONARY: Lungs clear bilaterally with good aeration. No wheezes rales or rhonchi. No respiratory distress.     ABDOMEN: Soft, nonsurgical. Nontender. No peritoneal signs. Normoactive bowel sounds. No pulsatile masses.     EXTREMITIES:  No peripheral edema. Negative Homans bilaterally, no cords.  2+ bounding pulses well-perfused    SKIN: No rash. Intact.     NEURO: No focal neurologic deficits, NIH score of 0. Cranial nerves normal as tested from II through XII.     MEDICAL DECISION MAKING:  Labs including CBC with differential chemistry liver functions have been obtained abdominal CAT scan is pending.    Treatment: IV established given IV fluids      ED course: Currently awaiting lab work and CT scan results    Impression: #1 diarrhea possibly viral enteritis    Plan/MDM: 44-year-old female comes in with diarrhea for the last few days intermittently very benign examination low concern for bacterial enteritis and/or Salmonella or Shigella or diverticulitis or C. difficile colitis, currently awaiting lab results CT scan results response to treatment, will be signed for oncoming colleague pending disposition.              Patient History   Past Medical History:   Diagnosis Date   • Lung nodules      No past surgical history on file.  Family History   Problem Relation Name Age of Onset   • Other (pre diab) Mother     • Multiple sclerosis Father     • Breast cancer Paternal Grandmother     • Breast cancer Father's Sister       Social History     Tobacco Use   • Smoking status: Some Days     Current packs/day: 0.25     Average packs/day: 0.3 packs/day for 30.7 years (7.7 ttl pk-yrs)     Types: Cigarettes     Start date: 1994     Passive exposure: Never   • Smokeless tobacco: Never   Substance Use Topics   • Alcohol use: Yes     Comment: socially   • Drug use: Yes     Frequency: 7.0 times per week     Types: Marijuana     Comment: since 10 years old daily       Physical Exam   ED Triage Vitals [09/20/24 1303]   Temperature Heart Rate Respirations BP   37 °C (98.6 °F) 75 16 117/82      Pulse Ox Temp src Heart Rate Source Patient Position   98 % -- -- --      BP Location FiO2 (%)     -- --       Physical Exam      ED Course & MDM   Diagnoses as of 09/20/24  1519   Acute diarrhea                 No data recorded     [place] Coma Scale Score: 15 (09/20/24 1503 : Jolene Booth RN)                           Medical Decision Making      Procedure  Procedures     Ivon Aragon MD  09/20/24 1513

## 2024-09-20 NOTE — ED TRIAGE NOTES
As provider-in-triage, I performed a medical screening history and physical exam on this patient.    HISTORY OF PRESENT ILLNESS  This is a 44-year-old female presenting to the ED for chief complaint of abdominal pain and diarrhea.  She explains on Tuesday she developed watery diarrhea and periumbilical cramping.  She explains that the pain became severe last night and she was in the fetal position and could not sleep.  She denies recent travel, recent antibiotics or recent hospitalizations.  No sick contacts.  She cannot identify anything that would have set this off.  Denies fever chills or sweats.  No issues urinating.  She explains that now the pain is in her back bilateral.  No numbness or tingling down the legs     PHYSICAL EXAM  Vital Signs reviewed and stable.  Abdomen is soft without peritoneal signs.  Heart rate and rhythm is regular.  Lungs clear.  Mucous membranes are moist     MDM  Abdominal pain workup  CT abdomen pelvis        I evaluated this patient in triage with the RN. Due to the patients complaint labs and or imaging were ordered by myself in an attempt to expedite patient care however I am not participating in care after evaluation. This is a preliminary assessment. Pt does not appear in acute distress at this time. They are stable and will have a full evaluation as soon as possible. They will be cared for by another provider who will possibly order more labs, imaging or interventions.

## 2024-09-21 LAB — HOLD SPECIMEN: NORMAL

## 2024-09-25 ENCOUNTER — APPOINTMENT (OUTPATIENT)
Dept: BEHAVIORAL HEALTH | Facility: CLINIC | Age: 44
End: 2024-09-25
Payer: MEDICAID

## 2024-10-08 ENCOUNTER — APPOINTMENT (OUTPATIENT)
Dept: BEHAVIORAL HEALTH | Facility: CLINIC | Age: 44
End: 2024-10-08
Payer: MEDICAID

## 2024-11-01 ENCOUNTER — APPOINTMENT (OUTPATIENT)
Dept: PRIMARY CARE | Facility: CLINIC | Age: 44
End: 2024-11-01
Payer: MEDICAID

## 2024-12-20 ENCOUNTER — HOSPITAL ENCOUNTER (EMERGENCY)
Facility: HOSPITAL | Age: 44
Discharge: HOME | End: 2024-12-20
Payer: MEDICAID

## 2024-12-20 ENCOUNTER — APPOINTMENT (OUTPATIENT)
Dept: RADIOLOGY | Facility: HOSPITAL | Age: 44
End: 2024-12-20
Payer: MEDICAID

## 2024-12-20 VITALS
BODY MASS INDEX: 37.74 KG/M2 | OXYGEN SATURATION: 97 % | WEIGHT: 213 LBS | HEART RATE: 81 BPM | RESPIRATION RATE: 16 BRPM | DIASTOLIC BLOOD PRESSURE: 88 MMHG | SYSTOLIC BLOOD PRESSURE: 131 MMHG | TEMPERATURE: 98.4 F | HEIGHT: 63 IN

## 2024-12-20 DIAGNOSIS — R51.9 NONINTRACTABLE HEADACHE, UNSPECIFIED CHRONICITY PATTERN, UNSPECIFIED HEADACHE TYPE: Primary | ICD-10-CM

## 2024-12-20 PROCEDURE — 99284 EMERGENCY DEPT VISIT MOD MDM: CPT | Mod: 25

## 2024-12-20 PROCEDURE — 99284 EMERGENCY DEPT VISIT MOD MDM: CPT | Performed by: NURSE PRACTITIONER

## 2024-12-20 PROCEDURE — 70450 CT HEAD/BRAIN W/O DYE: CPT

## 2024-12-20 PROCEDURE — 96374 THER/PROPH/DIAG INJ IV PUSH: CPT

## 2024-12-20 PROCEDURE — 96375 TX/PRO/DX INJ NEW DRUG ADDON: CPT

## 2024-12-20 PROCEDURE — 70450 CT HEAD/BRAIN W/O DYE: CPT | Performed by: RADIOLOGY

## 2024-12-20 PROCEDURE — 96361 HYDRATE IV INFUSION ADD-ON: CPT

## 2024-12-20 PROCEDURE — 2500000004 HC RX 250 GENERAL PHARMACY W/ HCPCS (ALT 636 FOR OP/ED): Mod: SE | Performed by: NURSE PRACTITIONER

## 2024-12-20 RX ORDER — KETOROLAC TROMETHAMINE 30 MG/ML
30 INJECTION, SOLUTION INTRAMUSCULAR; INTRAVENOUS ONCE
Status: COMPLETED | OUTPATIENT
Start: 2024-12-20 | End: 2024-12-20

## 2024-12-20 RX ORDER — METOCLOPRAMIDE 10 MG/1
10 TABLET ORAL EVERY 6 HOURS
Qty: 27 TABLET | Refills: 0 | Status: SHIPPED | OUTPATIENT
Start: 2024-12-20 | End: 2024-12-27

## 2024-12-20 RX ORDER — BUTALBITAL, ACETAMINOPHEN AND CAFFEINE 50; 325; 40 MG/1; MG/1; MG/1
1 TABLET ORAL EVERY 4 HOURS PRN
Qty: 17 TABLET | Refills: 0 | Status: SHIPPED | OUTPATIENT
Start: 2024-12-20

## 2024-12-20 RX ORDER — METOCLOPRAMIDE HYDROCHLORIDE 5 MG/ML
10 INJECTION INTRAMUSCULAR; INTRAVENOUS ONCE
Status: COMPLETED | OUTPATIENT
Start: 2024-12-20 | End: 2024-12-20

## 2024-12-20 RX ORDER — DIPHENHYDRAMINE HYDROCHLORIDE 50 MG/ML
25 INJECTION INTRAMUSCULAR; INTRAVENOUS ONCE
Status: COMPLETED | OUTPATIENT
Start: 2024-12-20 | End: 2024-12-20

## 2024-12-20 ASSESSMENT — PAIN - FUNCTIONAL ASSESSMENT
PAIN_FUNCTIONAL_ASSESSMENT: CRIES (CRYING REQUIRES OXYGEN INCREASED VITAL SIGNS EXPRESSION SLEEP)
PAIN_FUNCTIONAL_ASSESSMENT: 0-10

## 2024-12-20 ASSESSMENT — COLUMBIA-SUICIDE SEVERITY RATING SCALE - C-SSRS
1. IN THE PAST MONTH, HAVE YOU WISHED YOU WERE DEAD OR WISHED YOU COULD GO TO SLEEP AND NOT WAKE UP?: NO
6. HAVE YOU EVER DONE ANYTHING, STARTED TO DO ANYTHING, OR PREPARED TO DO ANYTHING TO END YOUR LIFE?: NO
2. HAVE YOU ACTUALLY HAD ANY THOUGHTS OF KILLING YOURSELF?: NO

## 2024-12-20 ASSESSMENT — PAIN DESCRIPTION - PAIN TYPE: TYPE: ACUTE PAIN

## 2024-12-20 ASSESSMENT — PAIN SCALES - GENERAL
PAINLEVEL_OUTOF10: 10 - WORST POSSIBLE PAIN
PAINLEVEL_OUTOF10: 10 - WORST POSSIBLE PAIN
PAINLEVEL_OUTOF10: 4

## 2024-12-20 ASSESSMENT — PAIN DESCRIPTION - DESCRIPTORS: DESCRIPTORS: HEADACHE

## 2024-12-20 ASSESSMENT — PAIN DESCRIPTION - LOCATION
LOCATION: HEAD
LOCATION: HEAD

## 2024-12-20 NOTE — ED PROVIDER NOTES
Chief Complaint   Patient presents with   • Headache       HPI       44 year old female presents to the Emergency Department today complaining of a 9 day history of a diffuse headache that she describes as pressure-like in nature, constant, and varies in intensity. The headache is not the first or worst of their life. Not of sudden onset or thunderclap in nature. Intermittent nausea associated with the above. Denies any associated fever, chills, neck pain, chest pain, shortness of breath, abdominal pain, vomiting, diarrhea, constipation, or urinary symptoms.       History provided by:  Patient             Patient History   Past Medical History:   Diagnosis Date   • Lung nodules      No past surgical history on file.  Family History   Problem Relation Name Age of Onset   • Other (pre diab) Mother     • Multiple sclerosis Father     • Breast cancer Paternal Grandmother     • Breast cancer Father's Sister       Social History     Tobacco Use   • Smoking status: Some Days     Current packs/day: 0.25     Average packs/day: 0.3 packs/day for 31.0 years (7.7 ttl pk-yrs)     Types: Cigarettes     Start date: 1994     Passive exposure: Never   • Smokeless tobacco: Never   Substance Use Topics   • Alcohol use: Yes     Comment: socially   • Drug use: Yes     Frequency: 7.0 times per week     Types: Marijuana     Comment: since 10 years old daily           Physical Exam  Constitutional:       Appearance: Normal appearance.   HENT:      Head: Normocephalic.      Right Ear: External ear normal.      Left Ear: External ear normal.      Nose: Nose normal.      Mouth/Throat:      Mouth: Mucous membranes are moist.      Pharynx: Oropharynx is clear. No oropharyngeal exudate or posterior oropharyngeal erythema.   Eyes:      Conjunctiva/sclera: Conjunctivae normal.      Pupils: Pupils are equal, round, and reactive to light.   Cardiovascular:      Rate and Rhythm: Normal rate and regular rhythm.      Pulses:           Radial pulses are  3+ on the right side and 3+ on the left side.        Dorsalis pedis pulses are 3+ on the right side and 3+ on the left side.      Heart sounds: Normal heart sounds. No murmur heard.     No friction rub. No gallop.   Pulmonary:      Effort: Pulmonary effort is normal. No respiratory distress.      Breath sounds: Normal breath sounds. No wheezing, rhonchi or rales.   Abdominal:      General: Abdomen is flat. Bowel sounds are normal.      Palpations: Abdomen is soft.      Tenderness: There is no abdominal tenderness. There is no right CVA tenderness, left CVA tenderness, guarding or rebound. Negative signs include Gibbons's sign and McBurney's sign.   Musculoskeletal:         General: No swelling or deformity.      Cervical back: Full passive range of motion without pain.      Right lower leg: No edema.      Left lower leg: No edema.   Lymphadenopathy:      Cervical: No cervical adenopathy.   Skin:     Capillary Refill: Capillary refill takes less than 2 seconds.      Coloration: Skin is not jaundiced.      Findings: No rash.   Neurological:      General: No focal deficit present.      Mental Status: She is alert and oriented to person, place, and time. Mental status is at baseline.      Gait: Gait is intact.   Psychiatric:         Mood and Affect: Mood normal.         Behavior: Behavior is cooperative.         Labs Reviewed - No data to display    CT head wo IV contrast   Final Result   There is no evidence of acute hemorrhage, mass lesion or acute   infarction.             I personally reviewed the images/study and I agree with radiology   resident Dr. Charla Moffett's findings as stated. This study was   interpreted at [place], [place].        MACRO:   None        Signed by: Anuel Pelaez 12/20/2024 3:30 PM   Dictation workstation:   [id number]               ED Course & MDM   ED Course as of 12/20/24 2498   Fri Dec 20, 2024   0389 Headache is improved, but still  present. Toradol ordered. [JZ]      ED Course User Index  [JZ] NAGI Garcia-CNP         Diagnoses as of 12/20/24 1735   Nonintractable headache, unspecified chronicity pattern, unspecified headache type           Medical Decision Making  Patient was seen and evaluated by myself. Saline lock was established. Originally Given Reglan and Benadryl with improvement. Later, she was given Toradol with further improvement. CT scan of her head shows there is no evidence of acute hemorrhage, mass lesion or acute infarction. Exhibits no signs of meningitis. I feel that she can be safely discharged home. Given prescriptions for Fioricet and Reglan. She reports to having a follow up appointment with neurology in the next 1-2 weeks. Return if worse in any way. Discharged in stable condition with computer instructions.    Diagnostic Impression:    1. Acute cephalgia    2. IV meds in ED    3. Prescription therapy              Your medication list        ASK your doctor about these medications        Instructions Last Dose Given Next Dose Due   acetaminophen 500 mg tablet  Commonly known as: Tylenol           cyclobenzaprine 10 mg tablet  Commonly known as: Flexeril      Take 1 tablet (10 mg) by mouth once daily at bedtime.       ergocalciferol 1.25 MG (82925 UT) capsule  Commonly known as: Vitamin D-2           ibuprofen 800 mg tablet           methocarbamol 750 mg tablet  Commonly known as: Robaxin           nicotine 7 mg/24 hr patch  Commonly known as: Nicoderm CQ      Place 1 patch over 24 hours on the skin once every 24 hours.                  Procedure  Procedures     NAGI Garcia-BK  12/20/24 1736

## 2024-12-27 ENCOUNTER — APPOINTMENT (OUTPATIENT)
Dept: PRIMARY CARE | Facility: CLINIC | Age: 44
End: 2024-12-27
Payer: MEDICAID

## 2024-12-30 ENCOUNTER — APPOINTMENT (OUTPATIENT)
Dept: NEUROSURGERY | Facility: CLINIC | Age: 44
End: 2024-12-30
Payer: MEDICAID

## 2025-01-08 ENCOUNTER — APPOINTMENT (OUTPATIENT)
Dept: PULMONOLOGY | Facility: CLINIC | Age: 45
End: 2025-01-08
Payer: MEDICAID

## 2025-01-30 ENCOUNTER — APPOINTMENT (OUTPATIENT)
Dept: ORTHOPEDIC SURGERY | Facility: CLINIC | Age: 45
End: 2025-01-30
Payer: MEDICAID

## 2025-01-31 ENCOUNTER — HOSPITAL ENCOUNTER (OUTPATIENT)
Dept: RADIOLOGY | Facility: CLINIC | Age: 45
Discharge: HOME | End: 2025-01-31
Payer: MEDICAID

## 2025-01-31 ENCOUNTER — OFFICE VISIT (OUTPATIENT)
Dept: ORTHOPEDIC SURGERY | Facility: CLINIC | Age: 45
End: 2025-01-31
Payer: MEDICAID

## 2025-01-31 DIAGNOSIS — M25.511 RIGHT SHOULDER PAIN, UNSPECIFIED CHRONICITY: ICD-10-CM

## 2025-01-31 DIAGNOSIS — M25.551 RIGHT HIP PAIN: ICD-10-CM

## 2025-01-31 DIAGNOSIS — M25.511 ACUTE PAIN OF RIGHT SHOULDER: ICD-10-CM

## 2025-01-31 PROCEDURE — 73502 X-RAY EXAM HIP UNI 2-3 VIEWS: CPT | Mod: RT

## 2025-01-31 PROCEDURE — 99203 OFFICE O/P NEW LOW 30 MIN: CPT | Performed by: ORTHOPAEDIC SURGERY

## 2025-01-31 PROCEDURE — 73030 X-RAY EXAM OF SHOULDER: CPT | Mod: RT
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
## 2025-02-07 ENCOUNTER — APPOINTMENT (OUTPATIENT)
Dept: PRIMARY CARE | Facility: CLINIC | Age: 45
End: 2025-02-07
Payer: MEDICAID

## 2025-02-11 ENCOUNTER — APPOINTMENT (OUTPATIENT)
Dept: PRIMARY CARE | Facility: CLINIC | Age: 45
End: 2025-02-11
Payer: MEDICAID

## 2025-03-10 ENCOUNTER — APPOINTMENT (OUTPATIENT)
Dept: NEUROSURGERY | Facility: CLINIC | Age: 45
End: 2025-03-10
Payer: MEDICAID